# Patient Record
Sex: FEMALE | Race: WHITE
[De-identification: names, ages, dates, MRNs, and addresses within clinical notes are randomized per-mention and may not be internally consistent; named-entity substitution may affect disease eponyms.]

---

## 2020-10-18 ENCOUNTER — HOSPITAL ENCOUNTER (EMERGENCY)
Dept: HOSPITAL 75 - ER FS | Age: 85
Discharge: HOME | End: 2020-10-18
Payer: MEDICARE

## 2020-10-18 VITALS — SYSTOLIC BLOOD PRESSURE: 183 MMHG | DIASTOLIC BLOOD PRESSURE: 84 MMHG

## 2020-10-18 DIAGNOSIS — S86.811A: Primary | ICD-10-CM
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
## 2020-10-18 LAB
APTT BLD: 33 SEC (ref 24–35)
BASOPHILS # BLD AUTO: 0.1 10^3/UL (ref 0–0.1)
BASOPHILS NFR BLD AUTO: 1 % (ref 0–10)
D DIMER PPP FEU-MCNC: 0.37 UG/ML (ref 0–0.49)
EOSINOPHIL # BLD AUTO: 0.2 10^3/UL (ref 0–0.3)
EOSINOPHIL NFR BLD AUTO: 3 % (ref 0–10)
HCT VFR BLD CALC: 42 % (ref 35–52)
HGB BLD-MCNC: 13.9 G/DL (ref 11.5–16)
INR PPP: 1 (ref 0.8–1.4)
LYMPHOCYTES # BLD AUTO: 2.6 X 10^3 (ref 1–4)
LYMPHOCYTES NFR BLD AUTO: 35 % (ref 12–44)
MANUAL DIFFERENTIAL PERFORMED BLD QL: NO
MCH RBC QN AUTO: 32 PG (ref 25–34)
MCHC RBC AUTO-ENTMCNC: 33 G/DL (ref 32–36)
MCV RBC AUTO: 95 FL (ref 80–99)
MONOCYTES # BLD AUTO: 0.6 X 10^3 (ref 0–1)
MONOCYTES NFR BLD AUTO: 8 % (ref 0–12)
NEUTROPHILS # BLD AUTO: 3.9 X 10^3 (ref 1.8–7.8)
NEUTROPHILS NFR BLD AUTO: 53 % (ref 42–75)
PLATELET # BLD: 137 10^3/UL (ref 130–400)
PMV BLD AUTO: 11.9 FL (ref 7.4–10.4)
PROTHROMBIN TIME: 13.7 SEC (ref 12.2–14.7)
WBC # BLD AUTO: 7.4 10^3/UL (ref 4.3–11)

## 2020-10-18 PROCEDURE — 85379 FIBRIN DEGRADATION QUANT: CPT

## 2020-10-18 PROCEDURE — 36415 COLL VENOUS BLD VENIPUNCTURE: CPT

## 2020-10-18 PROCEDURE — 85610 PROTHROMBIN TIME: CPT

## 2020-10-18 PROCEDURE — 85730 THROMBOPLASTIN TIME PARTIAL: CPT

## 2020-10-18 PROCEDURE — 85025 COMPLETE CBC W/AUTO DIFF WBC: CPT

## 2020-10-18 NOTE — ED LOWER EXTREMITY
General


Chief Complaint:  Lower Extremity


Stated Complaint:  LT LEG PAIN


Nursing Triage Note:  


started having left lower leg pain in the middle of the night. Is having 


difficulty walking on leg today. States pain is moderate, made worse with 


walking. Took advil an hour prior to arrival.


Nursing Sepsis Screen:  No Definite Risk


Source:  patient





History of Present Illness


Date Seen by Provider:  Oct 18, 2020


Time Seen by Provider:  10:32


Initial Comments


85-year-old female presenting with complaints of left calf pain. She states 

around 1 AM she started noticing she was having pain in her leg. She denies any 

injury or trauma. She also denies any recent surgery or long travel where she 

was sitting in one position. She denies any history of prior DVT or blood clot. 

She has no redness or swelling to her leg. She has no significant pain she is 

sitting or laying down. When she tries to walk or bear weight she has moderate 

pain in the back of her calf. Around 9:15 AM she reports trying to take 2 Advil.

When that did not help her pain she came to the emergency department. She denies

having symptoms like this previously. She has a history of hypothyroidism and 

hypertension.


Severity:  moderate


Pain/Injury Location:  left leg (posterior calf with bearing weight)


Method of Injury:  other (denies any injury)


Modifying Factors:  Improves With Other (pain with walking and bearing weight)





Allergies and Home Medications


Allergies


Coded Allergies:  


     No Known Drug Allergies (Unverified , 10/18/20)





Home Medications


Baclofen 10 Mg Tablet, 10 MG PO BID PRN for MUSCLE SPASMS


   Prescribed by: FEI JJ on 10/18/20 1142





Patient Home Medication List


Home Medication List Reviewed:  Yes





Review of Systems


Constitutional:  no symptoms reported


EENTM:  no symptoms reported


Respiratory:  no symptoms reported


Cardiovascular:  no symptoms reported


Gastrointestinal:  no symptoms reported


Genitourinary:  no symptoms reported


Musculoskeletal:  see HPI


Skin:  No change in color


Psychiatric/Neurological:  Denies Numbness, Denies Paresthesia





Past Medical-Social-Family Hx


Past Med/Social Hx:  Reviewed Nursing Past Med/Soc Hx


Patient Social History


Alcohol Use:  Denies Use


Recreational Drug Use:  No


Smoking Status:  Never a Smoker


2nd Hand Smoke Exposure:  No


Recent Foreign Travel:  No


Contact w/Someone Who Travel:  No


Recent Infectious Disease Expo:  No


Recent Hopitalizations:  No


Physical Abuse:  No


Sexual Abuse:  No


Mistreated:  No


Fear:  No





Seasonal Allergies


Seasonal Allergies:  Yes





Past Medical History


Respiratory:  No


Cardiac:  Yes


Hypertension


Neurological:  No


Genitourinary:  No


Gastrointestinal:  No


Musculoskeletal:  No


Endocrine:  Yes


Hypothyroidsim


HEENT:  No


Cancer:  No


Psychosocial:  No


Integumentary:  No


Blood Disorders:  No


Adverse Reaction/Blood Tranf:  No





Physical Exam


Vital Signs





Vital Signs - First Documented








 10/18/20





 10:37


 


Temp 36.4


 


Pulse 68


 


Resp 16


 


B/P (MAP) 183/84 (117)


 


Pulse Ox 98





Capillary Refill : Less Than 3 Seconds


Height, Weight, BMI


Height: '"


Weight: lbs. oz. kg;  BMI


Method:


General Appearance:  WD/WN, no apparent distress


Cardiovascular:  normal peripheral pulses


Legs:  bilateral leg non-tender, bilateral leg normal inspection, bilateral leg 

normal range of motion, bilateral leg no evidence of injury; left leg other 

(with standing patient complains of pain in her left calf. She denies pain with 

palpation of her calf while sitting or laying on the bed and having the leg 

examined)


Knees:  bilateral knee non-tender, bilateral knee normal inspection, bilateral 

knee normal range of motion, bilateral knee no evidence of injury


Ankles:  bilateral ankle non-tender, bilateral ankle normal inspection, 

bilateral ankle normal range of motion, bilateral ankle no evidence of injury


Neurologic/Tendon:  normal sensation, normal motor functions, normal tendon 

functions


Neurologic/Psychiatric:  CNs II-XII nml as tested, alert, normal mood/affect, 

oriented x 3


Skin:  normal color, warm/dry





Progress/Results/Core Measures


Results/Orders


Lab Results





Laboratory Tests








Test


 10/18/20


10:50 Range/Units


 


 


White Blood Count


 7.4 


 4.3-11.0


10^3/uL


 


Red Blood Count


 4.40 


 4.35-5.85


10^6/uL


 


Hemoglobin 13.9  11.5-16.0  G/DL


 


Hematocrit 42  35-52  %


 


Mean Corpuscular Volume 95  80-99  FL


 


Mean Corpuscular Hemoglobin 32  25-34  PG


 


Mean Corpuscular Hemoglobin


Concent 33 


 32-36  G/DL





 


Red Cell Distribution Width 13.2  10.0-14.5  %


 


Platelet Count


 137 


 130-400


10^3/uL


 


Mean Platelet Volume 11.9 H 7.4-10.4  FL


 


Immature Granulocyte % (Auto) 0   %


 


Neutrophils (%) (Auto) 53  42-75  %


 


Lymphocytes (%) (Auto) 35  12-44  %


 


Monocytes (%) (Auto) 8  0-12  %


 


Eosinophils (%) (Auto) 3  0-10  %


 


Basophils (%) (Auto) 1  0-10  %


 


Neutrophils # (Auto) 3.9  1.8-7.8  X 10^3


 


Lymphocytes # (Auto) 2.6  1.0-4.0  X 10^3


 


Monocytes # (Auto) 0.6  0.0-1.0  X 10^3


 


Eosinophils # (Auto)


 0.2 


 0.0-0.3


10^3/uL


 


Basophils # (Auto)


 0.1 


 0.0-0.1


10^3/uL


 


Immature Granulocyte # (Auto)


 0.0 


 0.0-0.1


10^3/uL


 


Prothrombin Time 13.7  12.2-14.7  SEC


 


INR Comment 1.0  0.8-1.4  


 


Activated Partial


Thromboplast Time 33 


 24-35  SEC





 


D-Dimer


 0.37 


 0.00-0.49


UG/ML








My Orders





Orders - FEI JJ MD


Fibrin Degradation Products (10/18/20 10:46)


Cbc With Automated Diff (10/18/20 10:46)


Protime With Inr (10/18/20 10:46)


Partial Thromboplastin Time (10/18/20 10:46)


Ketorolac Injection (Toradol Injection) (10/18/20 10:47)


Orphenadrine Inj (Ed Only) (Norflex Inje (10/18/20 10:47)





Vital Signs/I&O











 10/18/20





 10:37


 


Temp 36.4


 


Pulse 68


 


Resp 16


 


B/P (MAP) 183/84 (117)


 


Pulse Ox 98














Blood Pressure Mean:                    117











Progress


Progress Note #1:  


Progress Note


Advised that I could not give her an answer just off of exam since she denies 

any injury or doing anything to hurt/strain her calf. Will try Toradol with 

Norflex for stronger anti-inflammatory effect along with muscle relaxer.  Check 

basic labs including a D Dimer. If the D dimer is elevated could cover her with 

anticoagulant for possible DVT until she could have ultrasound/doppler performed

through the [facility] clinic tomorrow to check her calf. Currently on exam she has no 

erythema, swelling or cording and no pain with palpation except when she is 

standing. 


If labs are negative will try prescribing a muscle relaxer for her and have her 

check with clinic and see if they still want to do an ultrasound or schedule MRI

or how they want to progress. I would expect tenderness in any position and 

whether she was at rest or actively trying to use her calf and leg muscles if it

was a DVT, plus she was not able to provide me any risk factor for a DVT. This 

does not rule it out but makes it less likely.


Progress Note #2:  


   Time:  11:26


Progress Note


CBC, Coags and D dimer all stable without acute significant abnormality. 

Specifically the D dimer is not elevated to indicate increased likelihood of 

DVT. 


Will progress as planned with trial of Muscle relaxer and pain control. Have her

check with Dr. Case and [facility] clinic tomorrow if not improving and may need 

additional testing such as MRI or Ultrasound still to look at her calf muscle. 


Provide walker for patient to assist with ambulation until she can bear weight 

better without so much pain. She is able to walk and bear weight here in the ED 

but does have an antalgic and unsteady gait due to pain in left leg with bearing

weight.





Departure


Impression





   Primary Impression:  


   Tenderness of left calf


   Additional Impression:  


   Strain of left calf muscle


Disposition:  01 HOME, SELF-CARE


Condition:  Stable





Departure-Patient Inst.


Decision time for Depature:  11:36


Referrals:  


NINA CASE MD


Patient Instructions:  How to Use a Walker, Lower Extremity Muscle Strain (DC)





Add. Discharge Instructions:  


Try using a heating pad or hot pack to your calf and lower leg 15-20 minutes 

every few hours as needed for pain





Check with Dr. Case and the [facility] clinic by calling in am to see if they can get 

you in to be seen about the leg pain for further evaluation. Especially if your 

leg is not getting better they may need to do other testing beyond what is 

available in the ER.





Try muscle relaxer and you may continue with the Ibuprofen you were taking to 

try and help with pain in your leg as well. 





Use the walker to help with getting around until your leg is doing better. 





All discharge instructions reviewed with patient and/or family. Voiced 

understanding.


Scripts


Baclofen (Baclofen) 10 Mg Tablet


10 MG PO BID PRN for MUSCLE SPASMS for 10 Days, #20 TAB 0 Refills


   Prov: FEI JJ MD         10/18/20











FEI JJ MD               Oct 18, 2020 10:54

## 2021-09-03 ENCOUNTER — HOSPITAL ENCOUNTER (OUTPATIENT)
Dept: HOSPITAL 75 - RAD FS | Age: 86
End: 2021-09-03
Attending: FAMILY MEDICINE
Payer: MEDICARE

## 2021-09-03 DIAGNOSIS — R91.8: Primary | ICD-10-CM

## 2021-09-03 PROCEDURE — 71046 X-RAY EXAM CHEST 2 VIEWS: CPT

## 2021-09-03 NOTE — DIAGNOSTIC IMAGING REPORT
EXAMINATION: CHEST (PA AND LATERAL)



CLINICAL INDICATION: 86-year-old female, cough.



COMPARISON: None.



FINDINGS: Heart size and mediastinal contours are unremarkable.

There is no identified pneumothorax. There is no pleural

effusion. Streaky opacities in the left medial lung base. There

is a lumbar scoliosis.



IMPRESSION: 

1. Streaky opacities in left medial lung base which may relate to

atelectasis and/or infiltrate.



Dictated by: 



  Dictated on workstation # [id number]

## 2021-09-15 ENCOUNTER — HOSPITAL ENCOUNTER (OUTPATIENT)
Dept: HOSPITAL 75 - RAD FS | Age: 86
End: 2021-09-15
Attending: FAMILY MEDICINE
Payer: MEDICARE

## 2021-09-15 DIAGNOSIS — J18.9: Primary | ICD-10-CM

## 2021-09-15 PROCEDURE — 71046 X-RAY EXAM CHEST 2 VIEWS: CPT

## 2021-09-15 NOTE — DIAGNOSTIC IMAGING REPORT
INDICATION: Left lung pneumonia.



PA and lateral views of the chest are obtained with comparison

made to study of 09/03/2021.



FINDINGS: Overall heart size and pulmonary vascularity are within

normal limits. There is air trapping in the upper lobes,

bilaterally. There has been an increase in groundglass and

interstitial markings in the perihilar and basilar regions of

both lungs. There is no significant pleural fluid.



IMPRESSION: Increasing predominantly perihilar and basilar

infiltrates in both lungs be compatible with progressive

pneumonitis.



Dictated by: 



  Dictated on workstation # [id number]

## 2021-09-22 ENCOUNTER — HOSPITAL ENCOUNTER (OUTPATIENT)
Dept: HOSPITAL 75 - RAD FS | Age: 86
End: 2021-09-22
Attending: FAMILY MEDICINE
Payer: MEDICARE

## 2021-09-22 DIAGNOSIS — R05: ICD-10-CM

## 2021-09-22 DIAGNOSIS — R91.8: Primary | ICD-10-CM

## 2021-09-22 PROCEDURE — 71046 X-RAY EXAM CHEST 2 VIEWS: CPT

## 2021-09-22 NOTE — DIAGNOSTIC IMAGING REPORT
INDICATION: Pneumonia, follow-up



PA and lateral chest obtained at 0957 a.m. and compared to

09/15/2021.



Heart is normal in size. The bibasilar infrahilar infiltrates

appear similar to the previous study. There is no pneumothorax or

pleural fluid.



IMPRESSION:



Mild bibasilar infrahilar infiltrates are present which are

similar to the previous study. There is no new abnormality in the

chest.



Dictated by: 



  Dictated on workstation # [id number]

## 2021-10-01 ENCOUNTER — HOSPITAL ENCOUNTER (OUTPATIENT)
Dept: HOSPITAL 75 - RAD FS | Age: 86
End: 2021-10-01
Attending: FAMILY MEDICINE
Payer: MEDICARE

## 2021-10-01 DIAGNOSIS — J18.9: Primary | ICD-10-CM

## 2021-10-01 PROCEDURE — 71046 X-RAY EXAM CHEST 2 VIEWS: CPT

## 2021-10-01 NOTE — DIAGNOSTIC IMAGING REPORT
INDICATION:  PNEUMONIA OF LEFT LUNG DUE TO INFECTIOUS ORGANISM.  



TECHNIQUE:  Two view chest   10:07 AM



CORRELATION STUDY:   09/22/2021



FINDINGS: 

The heart size, mediastinal configuration and pulmonary

vasculature are within normal limits.  

Streaky opacities at both lung bases, left greater than right,

are present. Some of this appears to be likely attributed to

overlapping summation shadow. Minimal infiltrate not excluded at

the left lung base. No shady air bronchograms. Scoliotic

curvature and advanced degenerative change of the lumbar spine.



IMPRESSION: 

1. Question minimal infiltrate left lung base versus overlapping

summation shadow.



Dictated by: 



  Dictated on workstation # [id number]

## 2022-01-28 ENCOUNTER — HOSPITAL ENCOUNTER (EMERGENCY)
Dept: HOSPITAL 75 - ER FS | Age: 87
End: 2022-01-28
Payer: MEDICARE

## 2022-01-28 DIAGNOSIS — Z51.5: ICD-10-CM

## 2022-01-28 DIAGNOSIS — Z79.890: ICD-10-CM

## 2022-01-28 DIAGNOSIS — Z79.899: ICD-10-CM

## 2022-01-28 DIAGNOSIS — I10: ICD-10-CM

## 2022-01-28 DIAGNOSIS — J96.00: Primary | ICD-10-CM

## 2022-01-28 DIAGNOSIS — E03.9: ICD-10-CM

## 2022-01-28 DIAGNOSIS — R00.0: ICD-10-CM

## 2022-01-28 LAB
AMORPH SED URNS QL MICRO: (no result) /LPF
APTT PPP: YELLOW S
BACTERIA #/AREA URNS HPF: (no result) /HPF
BILIRUB UR QL STRIP: (no result)
FIBRINOGEN PPP-MCNC: (no result) MG/DL
GLUCOSE UR STRIP-MCNC: NEGATIVE MG/DL
GRAN CASTS #/AREA URNS LPF: (no result) /LPF
HYALINE CASTS #/AREA URNS LPF: (no result) /LPF
KETONES UR QL STRIP: (no result)
LEUKOCYTE ESTERASE UR QL STRIP: NEGATIVE
NITRITE UR QL STRIP: NEGATIVE
PH UR STRIP: 5.5 [PH] (ref 5–9)
PROT UR QL STRIP: (no result)
RBC #/AREA URNS HPF: (no result) /HPF
SP GR UR STRIP: >=1.03 (ref 1.02–1.02)
SQUAMOUS #/AREA URNS HPF: (no result) /HPF
WBC #/AREA URNS HPF: (no result) /HPF

## 2022-01-28 PROCEDURE — 93005 ELECTROCARDIOGRAM TRACING: CPT

## 2022-01-28 PROCEDURE — 36680 INSERT NEEDLE BONE CAVITY: CPT

## 2022-01-28 PROCEDURE — 70450 CT HEAD/BRAIN W/O DYE: CPT

## 2022-01-28 PROCEDURE — 93041 RHYTHM ECG TRACING: CPT

## 2022-01-28 PROCEDURE — 51702 INSERT TEMP BLADDER CATH: CPT

## 2022-01-28 PROCEDURE — 96365 THER/PROPH/DIAG IV INF INIT: CPT

## 2022-01-28 PROCEDURE — 71045 X-RAY EXAM CHEST 1 VIEW: CPT

## 2022-01-28 PROCEDURE — 96375 TX/PRO/DX INJ NEW DRUG ADDON: CPT

## 2022-01-28 PROCEDURE — 96361 HYDRATE IV INFUSION ADD-ON: CPT

## 2022-01-28 PROCEDURE — 81000 URINALYSIS NONAUTO W/SCOPE: CPT

## 2022-01-28 PROCEDURE — 96366 THER/PROPH/DIAG IV INF ADDON: CPT

## 2022-01-28 NOTE — ED GENERAL
General


Stated Complaint:  UNRESPONSIVE





History of Present Illness


Date Seen by Provider:  2022


Time Seen by Provider:  11:03


Initial Comments


86-year-old female brought in after being found unresponsive.  Per patient's 

last known wellness time was sometime yesterday.  Patient just recovered from 

Covid and his  quarantine time.  EMS was called because she was found 

shallow guppy breathing and unresponsive.  When they arrived she had fixed 

pinpoint pupils breathing about 39/min very shallow with O2 saturations in the 

50.  EMS placed a 7/2 ET tube bag mask along with IO and a peripheral 18-gauge 

IV.  Patient's daughter reports that she saw her yesterday and she was doing 

fine.  It is unknown how long patient has been down when she was checked on by 

staff this morning.





Allergies and Home Medications


Allergies


Coded Allergies:  


     No Known Drug Allergies (Unverified , 10/18/20)





Patient Home Medication List


Home Medication List Reviewed:  Yes


Atenolol (Atenolol) 25 Mg Tablet, (Reported)


   Entered as Reported by: JHOAN HOUSER on 10/18/20 1043


Baclofen (Baclofen) 10 Mg Tablet, 10 MG PO BID PRN for MUSCLE SPASMS


   Prescribed by: FEI JJ on 10/18/20 1142


Levothyroxine Sodium (Euthyrox) 88 Mcg Tablet, (Reported)


   Entered as Reported by: JHOAN HOUSER on 10/18/20 1043





Review of Systems


Review of Systems


Constitutional:  see HPI


Unable to obtain further review of systems due to patient being unresponsive and

intubated





Past Medical-Social-Family Hx


Seasonal Allergies


Seasonal Allergies:  Yes





Past Medical History


Respiratory:  No


Cardiac:  Yes


Hypertension


Neurological:  No


Genitourinary:  No


Gastrointestinal:  No


Musculoskeletal:  No


Endocrine:  Yes


Hypothyroidsim


HEENT:  No


Cancer:  No


Psychosocial:  No


Integumentary:  No


Blood Disorders:  No


Adverse Reaction/Blood Tranf:  No





Physical Exam


Vital Signs





Vital Signs - First Documented








 22





 11:08


 


Temp 35.6


 


Pulse 104


 


Resp 16


 


B/P (MAP) 80/57 (65)


 


Pulse Ox 91


 


O2 Delivery Ambu Bag





Capillary Refill :


Height, Weight, BMI


Height: '"


Weight: lbs. oz. kg;  BMI


Method:


General Appearance:  Other (Critically ill, intubated, unresponsive)


Eyes:  Bilateral Eye Other (Pinpoint pupils)


Respiratory:  Other (Intubated)


Cardiovascular:  Tachycardia


Gastrointestinal:  Soft


Extremity:  Other (No acute findings)


Neurologic/Psychiatric:  Other (Intubated)





Progress/Results/Core Measures


Suspected Sepsis


SIRS


Temperature: 


Pulse:  


Respiratory Rate: 


 


Laboratory Tests


22 11:45: 


Blood Pressure  / 


Mean: 


 





Laboratory Tests


22 11:45: 








Results/Orders


Lab Results





Laboratory Tests








Test


 22


11:45 22


11:48 Range/Units


 


 


Urine Color  YELLOW   


 


Urine Clarity  TURBID   


 


Urine pH  5.5  5-9  


 


Urine Specific Gravity  >=1.030  1.016-1.022  


 


Urine Protein  1+ H NEGATIVE  


 


Urine Glucose (UA)  NEGATIVE  NEGATIVE  


 


Urine Ketones  TRACE H NEGATIVE  


 


Urine Nitrite  NEGATIVE  NEGATIVE  


 


Urine Bilirubin  1+ H NEGATIVE  


 


Urine Urobilinogen  0.2  < = 1.0  MG/DL


 


Urine Leukocyte Esterase  NEGATIVE  NEGATIVE  


 


Urine RBC (Auto)  2+ H NEGATIVE  


 


Urine RBC  RARE   /HPF


 


Urine WBC  RARE   /HPF


 


Urine Squamous Epithelial


Cells 


 2-5 


  /HPF





 


Urine Crystals  PRESENT H  /LPF


 


Urine Amorphous Sediment


 


 FEW OSWALDO


URATES H  /LPF





 


Urine Bacteria  TRACE   /HPF


 


Urine Casts  PRESENT   /LPF


 


Urine Hyaline Casts  5-10 H  /LPF


 


Urine Granular Casts  0-2 H  /LPF


 


Urine Mucus  MODERATE H  /LPF


 


Urine Culture Indicated  NO   








My Orders





Orders - CORREA,TULIO L DO


Ct Head Wo (22 11:03)


Cbc With Automated Diff (22 11:03)


Comprehensive Metabolic Panel (22 11:03)


Lactic Acid Analyzer (22 11:03)


Magnesium (22 11:03)


Procalcitonin (Pct) (22 11:03)


Ua Culture If Indicated (22 11:03)


Troponin I Fs (22 11:03)


Chest 1 View Ap/Pa Only (22 11:03)


Ekg Tracing (22 11:03)


O2 (22 11:03)


Monitor-Rhythm Ecg Trace Only (22 11:03)


End Tidal Co2 (22 11:03)


Catheter(Urinary) Insert & Ass 03,15 (22 11:03)


Ns Iv 1000 Ml (Sodium Chloride 0.9%) (22 11:15)


Norepinephrine 8 Mg/250 Ml (Norepinephri (22 11:21)


Norepinephrine 8 Mg/250 Ml (Norepinephri (22 12:45)


Ns Iv 1000 Ml (Sodium Chloride 0.9%) (22 14:24)


Morphine  Injection (Morphine  Injection (22 14:50)


Morphine  Injection (Morphine  Injection (22 14:52)





Medications Given in ED





Current Medications








 Medications  Dose


 Ordered  Sig/Bro


 Route  Start Time


 Stop Time Status Last Admin


Dose Admin


 


 Sodium Chloride  1,000 ml @ 


   STK-MED ONCE


 .ROUTE  22 14:24


 22 14:27 DC 22 14:30


125 MLS/HR








Vital Signs/I&O











 22





 11:08 11:40


 


Temp 35.6 


 


Pulse 104 108


 


Resp 16 


 


B/P (MAP) 80/57 (65) 76/42


 


Pulse Ox 91 


 


O2 Delivery Ambu Bag 





Capillary Refill :


Progress Note :  


Progress Note


Patient with what appears to be an anoxic brain injury.  Patient remains 

nonresponsive while here in the ER.  After much discussion with family, it was 

decided that she will be placed on palliative care.  To help with the comfort of

the family and to be there when she passes we will transfer her to [facility], attending Dr. Coleman where at that time we will extubate her and

she will be placed on full palliative care.  I agree with family's decision to 

place her on palliative care is she does not have what appears to be a 

reversible condition.








Patient was rapidly deteriorating.  Family decided that they were okay with us 

stopping IV fluids and to go ahead and extubate here in the ER.  Patient was 

extubated and peacefully passed away at 1515.





ECG


Initial ECG Impression Date:  2022


Initial ECG Impression Time:  10:55


Initial ECG Rate:  95


Initial ECG Rhythm:  A Fib/Flutter


Initial ECG Intervals:  QT (qtc 488)


Comment


atrial flutter, non specific changes.





Departure


Impression





   Primary Impression:  


   Acute respiratory failure


   Qualified Codes:  J96.01 - Acute respiratory failure with hypoxia


   Additional Impression:  


   Palliative care patient


Disposition:  20 


Condition:  





Transfer


Transfer Reason:  Diversion


Time Spoke to Accepting Phy:  13:45


Transfer Facility:  


[facility]


Method of Transfer:  EMS





Departure-Patient Inst.


Referrals:  


SELF,NINA STEVEN (PCP/Family)


Primary Care Physician











TULIO CORREA DO               2022 11:03

## 2022-01-28 NOTE — DIAGNOSTIC IMAGING REPORT
CLINICAL INDICATIONS: Patient found unresponsive, unknown down

time, saturations in 50s on room air. COVID positive last week.



EXAM: Portable chest x-ray upright view.



COMPARISON: Chest x-ray dated 10/01/2021.



FINDINGS:



There is interval progression of patchy airspace opacities and

diffuse groundglass opacification of both lungs. There is no

pleural effusion or  pneumothorax. Pulmonary vasculature and

cardiac silhouette within normal limits. ET tube has been placed

in the interim with tip seen roughly 1.5 cm from the level of the

dario. This tube should be withdrawn roughly 2-3 cm to ensure

good position. Feeding tube is seen with tip overlying the

expected region of the mid thoracic esophageal region.



There are degenerative spurs seen throughout spine.



IMPRESSION:

1.: There is placement of ET tube and feeding tube which needs

slight repositioning, as described above.



2: There is interval development of diffuse groundglass

opacification and patchy airspace opacities involving both lungs.

These findings may be related to atelectasis or lung infiltrates.



3: There is no pneumothorax or pleural effusion.



Dictated by: 



  Dictated on workstation # [id number]

## 2022-01-28 NOTE — DIAGNOSTIC IMAGING REPORT
INDICATION: Found down. Unresponsive. Hypoxia. Altered mental

status.



TECHNIQUE: Routine non contrast-enhanced axial images were

obtained from the skull base to the vertex. Auto Exposure

Controls were utilized during the CT exam to meet ALARA standards

for radiation dose reduction



COMPARISON: None.



FINDINGS: The ventricles and cortical sulci are diffusely

prominent, compatible with age-related volume loss. There are

confluent areas of abnormal, low attenuation in the

periventricular white matter. This is consistent with small

vessel ischemic changes; age-indeterminate. There is no prior

study available for comparison.  



There is no midline shift or mass-effect. No acute intra-axial

hemorrhage is seen. There are no abnormal areas of increased or

decreased density to suggest acute hemorrhage or edema. No

extra-axial masses or collections are present. The bony calvarium

is intact. The visualized paranasal sinuses show minimal

scattered mucosal thickening. The mastoid air cells are clear.



IMPRESSION:  

1. No acute intracranial abnormality. No CT evidence of mass,

acute infarct or intracranial hemorrhage.

2. Small vessel ischemic changes in the periventricular and

subcortical white matter; likely chronic. 



 



Dictated by: 



  Dictated on workstation # [id number]